# Patient Record
Sex: MALE
[De-identification: names, ages, dates, MRNs, and addresses within clinical notes are randomized per-mention and may not be internally consistent; named-entity substitution may affect disease eponyms.]

---

## 2017-07-31 ENCOUNTER — APPOINTMENT (OUTPATIENT)
Dept: ORTHOPEDIC SURGERY | Facility: CLINIC | Age: 52
End: 2017-07-31

## 2022-05-02 ENCOUNTER — APPOINTMENT (OUTPATIENT)
Dept: ORTHOPEDIC SURGERY | Facility: CLINIC | Age: 57
End: 2022-05-02
Payer: COMMERCIAL

## 2022-05-02 VITALS — HEIGHT: 70 IN | WEIGHT: 202 LBS | BODY MASS INDEX: 28.92 KG/M2

## 2022-05-02 DIAGNOSIS — Z78.9 OTHER SPECIFIED HEALTH STATUS: ICD-10-CM

## 2022-05-02 PROCEDURE — 73030 X-RAY EXAM OF SHOULDER: CPT | Mod: RT

## 2022-05-02 PROCEDURE — 99213 OFFICE O/P EST LOW 20 MIN: CPT | Mod: 25

## 2022-05-02 PROCEDURE — 73010 X-RAY EXAM OF SHOULDER BLADE: CPT | Mod: RT

## 2022-05-02 PROCEDURE — 99214 OFFICE O/P EST MOD 30 MIN: CPT

## 2022-05-03 NOTE — HISTORY OF PRESENT ILLNESS
[de-identified] : The patient is a 56 year old right hand dominant male who presents today complaining of right shoulder pain .\par Date of Injury/Onset: 3/2022\par Pain:    At Rest: 0/10 \par With Activity:  9/10 \par Mechanism of injury: pt states he was lifting a heavy object at home and felt immediate pain that has not resolved.\par This is not a Work Related Injury being treated under Worker's Compensation.\par This is not an athletic injury occurring associated with an interscholastic or organized sports team.\par Quality of symptoms: sharp pain with movement \par Improves with: rest\par Worse with: moving arm any direction\par Prior treatment: none\par Prior Imaging: none\par Reports Available For Review Today: none\par Out of work/sport: currently working\par School/Sport/Position/Occupation: [employer] \par Additional Information: none\par \par

## 2022-05-03 NOTE — IMAGING
[Right] : right shoulder [There are no fractures, subluxations or dislocations. No significant abnormalities are seen] : There are no fractures, subluxations or dislocations. No significant abnormalities are seen [de-identified] : The patient is a well appearing 56 year old M of their stated age.\par Neck is supple & nontender to palpation. Negative Spurling's test.\par \par Effected Shoulder:  right\par Inspection:\par Scapula Winging: Negative\par Deformity: None\par Erythema: None\par Ecchymosis: None\par Abrasions: None\par Effusion: None\par \par Range of Motion:\par Active Forward Flexion: 160 degrees \par Active Abduction: 160 degrees \par Passive Forward Flexion: 180 degrees \par Passive Abduction: 180 degrees \par ER @ 90 degrees: 90 degrees\par IR @ 90 degrees: 45 degrees\par ER @ 0 degrees: 50 degrees\par \par Motor Exam:\par Forward Flexion: 3 out of 5\par Flexion Plane of Scapula: 3 out of 5\par Abduction: 3 out of 5\par Internal Rotation: 3 out of 5\par External Rotation: 4 out of 5\par Distal Motor Strength: 5 out of 5\par Stability Testing:\par Anterior: 1+\par Posterior: 1+\par Sulcus N: 1+\par Sulcus ER: 1+\par \par Provocative Tests:\par Drop Arm: Negative\par Impingement: +\par [place]: Negative\par X-Arm Adduction: Negative\par Belly Press: Negative\par Bear Hug: Negative\par Lift Off: Negative\par Apprehension: Negative\par Relocation: Negative\par Posterior Load & Shift: Negative\par Palpation:\par AC Joint: Nontender\par Clavicle: Nontender\par SC Joint: Nontender\par Bicepital Groove: Nontender\par Coracoid Process: Nontender\par Pectoralis Minor Tendon: Nontender\par Pectoralis Major Tendon: Nontender & palpably intact\par Latissimus Dorsi: Nontender \par Proximal Humerus: Nontender\par Scapula Body: Nontender\par Medial Scapula Boarder: Nontender\par Scapula Spine: Nontender\par \par Neurologic Exam: Sensation to Light Touch:\par Axillary: Grossly intact\par Ulnar: Grossly intact\par Radial: Grossly intact\par Median: Grossly intact\par Other:  N/A\par Circulatory/Pulses:\par Ulnar: 2+\par Radial: 2+\par Other Pertinent Findings: None\par \par Contralateral Shoulder\par Range of Motion:\par Active Forward Flexion: 180 degrees \par Active Abduction: 180 degrees \par Passive Forward Flexion: 180 degrees \par Passive Abduction: 180 degrees \par ER @ 90 degrees: 90 degrees\par IR @ 90 degrees: 45 degrees\par ER @ 0 degrees: 50 degrees\par Motor Exam:\par Forward Flexion: 5 out of 5\par Flexion Plane of Scapula: 5 out of 5\par Abduction: 5 out of 5\par Internal Rotation: 5 out of 5\par External Rotation: 5 out of 5\par Distal Motor Strength: 5 out of 5\par Stability Testing:\par Anterior: 1+\par Posterior: 1+\par Sulcus N: 1+\par Sulcus ER: 1+\par Other Pertinent Findings: None\par \par \par Assessment: The patient is a 56 year old M with right shoulder pain and radiographic and 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\par   \par The patient’s condition is acute\par Documents/Results Reviewed Today: Xray right shoulder scapula\par Tests/Studies Independently Interpreted Today: Xray right shoulder scapula is unremarkable\par Pertinent findings include: +IMP, 3/5 FF FSP ABD IR, 4/5 ER, 160/160\par Confounding medical conditions/concerns: none\par \par Plan:  Patient will obtain MRI of their right shoulder\par Tests Ordered: MRI right shoulder to eval for RCT\par Prescription Medications Ordered: none\par Braces/DME Ordered: none\par Activity/Work/Sports Status: [employer]\par Additional Instructions: none\par Follow-Up: after MRI\par \par The patient's current medication management of their orthopedic diagnosis was reviewed today:\par (1) We discussed a comprehensive treatment plan that included possible pharmaceutical management involving the use of prescription strength medications including but not limited to options such as oral Naprosyn 500mg BID, once daily Meloxicam 15 mg, or 500-650 mg Tylenol versus over the counter oral medications and topical prescription NSAID Pennsaid vs over the counter Voltaren gel.\par (2) There is a moderate risk of morbidity with further treatment, especially from use of prescription strength medications and possible side effects of these medications which include upset stomach with oral medications, skin reactions to topical medications and cardiac/renal issues with long term use.\par (3) I recommended that the patient follow-up with their medical physician to discuss any significant specific potential issues with long term medication use such as interactions with current medications or with exacerbation of underlying medical comorbidities.\par (4) The benefits and risks associated with use of injectable, oral or topical, prescription and over the counter anti-inflammatory medications were discussed with the patient. The patient voiced understanding of the risks including but not limited to bleeding, stroke, kidney dysfunction, heart disease, and were referred to the black box warning label for further information.\par \par I, Mitchell Lai, attest that this documentation has been prepared under the direction and in the presence of Provider Dr. Geronimo\par \par The documentation recorded by the scribe accurately reflects the service I personally performed and the decisions made by me.\par \par \par  [FreeTextEntry1] : unremarkable  [FreeTextEntry5] : unremarkable

## 2022-05-09 ENCOUNTER — APPOINTMENT (OUTPATIENT)
Dept: MRI IMAGING | Facility: CLINIC | Age: 57
End: 2022-05-09

## 2022-05-13 ENCOUNTER — APPOINTMENT (OUTPATIENT)
Dept: MRI IMAGING | Facility: CLINIC | Age: 57
End: 2022-05-13

## 2022-05-16 ENCOUNTER — APPOINTMENT (OUTPATIENT)
Dept: ORTHOPEDIC SURGERY | Facility: CLINIC | Age: 57
End: 2022-05-16

## 2022-05-23 ENCOUNTER — APPOINTMENT (OUTPATIENT)
Dept: ORTHOPEDIC SURGERY | Facility: CLINIC | Age: 57
End: 2022-05-23

## 2022-05-26 ENCOUNTER — APPOINTMENT (OUTPATIENT)
Dept: ORTHOPEDIC SURGERY | Facility: CLINIC | Age: 57
End: 2022-05-26
Payer: COMMERCIAL

## 2022-05-26 VITALS — HEIGHT: 70 IN | WEIGHT: 202 LBS | BODY MASS INDEX: 28.92 KG/M2

## 2022-05-26 PROCEDURE — 99214 OFFICE O/P EST MOD 30 MIN: CPT

## 2022-05-26 RX ORDER — METHYLPREDNISOLONE 4 MG/1
4 TABLET ORAL
Qty: 1 | Refills: 0 | Status: ACTIVE | COMMUNITY
Start: 2022-05-26 | End: 1900-01-01

## 2022-05-27 NOTE — IMAGING
[Right] : right shoulder [There are no fractures, subluxations or dislocations. No significant abnormalities are seen] : There are no fractures, subluxations or dislocations. No significant abnormalities are seen [FreeTextEntry1] : unremarkable  [FreeTextEntry5] : unremarkable  [de-identified] : The patient is a well appearing 56 year old M of their stated age.\par Neck is supple & nontender to palpation. Negative Spurling's test.\par \par Effected Shoulder:  right\par Inspection:\par Scapula Winging: Negative\par Deformity: None\par Erythema: None\par Ecchymosis: None\par Abrasions: None\par Effusion: None\par \par Range of Motion:\par Active Forward Flexion: 160 degrees \par Active Abduction: 160 degrees \par Passive Forward Flexion: 180 degrees \par Passive Abduction: 180 degrees \par ER @ 90 degrees: 90 degrees\par IR @ 90 degrees: 45 degrees\par ER @ 0 degrees: 50 degrees\par \par Motor Exam:\par Forward Flexion: 3 out of 5\par Flexion Plane of Scapula: 3 out of 5\par Abduction: 3 out of 5\par Internal Rotation: 3 out of 5\par External Rotation: 4 out of 5\par Distal Motor Strength: 5 out of 5\par Stability Testing:\par Anterior: 1+\par Posterior: 1+\par Sulcus N: 1+\par Sulcus ER: 1+\par \par Provocative Tests:\par Drop Arm: Negative\par Impingement: +\par [place]: Negative\par X-Arm Adduction: Negative\par Belly Press: Negative\par Bear Hug: Negative\par Lift Off: Negative\par Apprehension: Negative\par Relocation: Negative\par Posterior Load & Shift: Negative\par Palpation:\par AC Joint: Nontender\par Clavicle: Nontender\par SC Joint: Nontender\par Bicepital Groove: Nontender\par Coracoid Process: Nontender\par Pectoralis Minor Tendon: Nontender\par Pectoralis Major Tendon: Nontender & palpably intact\par Latissimus Dorsi: Nontender \par Proximal Humerus: Nontender\par Scapula Body: Nontender\par Medial Scapula Boarder: Nontender\par Scapula Spine: Nontender\par \par Neurologic Exam: Sensation to Light Touch:\par Axillary: Grossly intact\par Ulnar: Grossly intact\par Radial: Grossly intact\par Median: Grossly intact\par Other:  N/A\par Circulatory/Pulses:\par Ulnar: 2+\par Radial: 2+\par Other Pertinent Findings: None\par \par Contralateral Shoulder\par Range of Motion:\par Active Forward Flexion: 180 degrees \par Active Abduction: 180 degrees \par Passive Forward Flexion: 180 degrees \par Passive Abduction: 180 degrees \par ER @ 90 degrees: 90 degrees\par IR @ 90 degrees: 45 degrees\par ER @ 0 degrees: 50 degrees\par Motor Exam:\par Forward Flexion: 5 out of 5\par Flexion Plane of Scapula: 5 out of 5\par Abduction: 5 out of 5\par Internal Rotation: 5 out of 5\par External Rotation: 5 out of 5\par Distal Motor Strength: 5 out of 5\par Stability Testing:\par Anterior: 1+\par Posterior: 1+\par Sulcus N: 1+\par Sulcus ER: 1+\par Other Pertinent Findings: None\par \par \par Assessment: The patient is a 56 year old M with right shoulder pain and radiographic and 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 Patient is again indicated to obtain MRI of their right shoulder\par Tests Ordered: MRI right shoulder to eval for RCT\par Prescription Medications Ordered: MDP\par Braces/DME Ordered: none\par Activity/Work/Sports Status: [employer]\par Additional Instructions: none\par Follow-Up: after MRI\par \par The patient's current medication management of their orthopedic diagnosis was reviewed today:\par (1) We discussed a comprehensive treatment plan that included possible pharmaceutical management involving the use of prescription strength medications including but not limited to options such as oral Naprosyn 500mg BID, once daily Meloxicam 15 mg, or 500-650 mg Tylenol versus over the counter oral medications and topical prescription NSAID Pennsaid vs over the counter Voltaren gel.\par (2) There is a moderate risk of morbidity with further treatment, especially from use of prescription strength medications and possible side effects of these medications which include upset stomach with oral medications, skin reactions to topical medications and cardiac/renal issues with long term use.\par (3) I recommended that the patient follow-up with their medical physician to discuss any significant specific potential issues with long term medication use such as interactions with current medications or with exacerbation of underlying medical comorbidities.\par (4) The benefits and risks associated with use of injectable, oral or topical, prescription and over the counter anti-inflammatory medications were discussed with the patient. The patient voiced understanding of the risks including but not limited to bleeding, stroke, kidney dysfunction, heart disease, and were referred to the black box warning label for further information.\par \par I, Jeanie Rascon, attest that this documentation has been prepared under the direction and in the presence of Provider Dr. Geronimo\par \par \par The documentation recorded by the scribe accurately reflects the service I personally performed and the decisions made by me.\par The patient was seen by me under the direct supervision of Dr. Moustapha Geronimo\par \par

## 2022-05-27 NOTE — HISTORY OF PRESENT ILLNESS
[de-identified] : The patient is a 56 year old right hand dominant male who presents today complaining of right shoulder pain .\par Date of Injury/Onset: 3/2022\par Pain:    At Rest: 0/10 \par With Activity:  9/10 \par Mechanism of injury: pt states he was lifting a heavy object at home and felt immediate pain that has not resolved.\par This is not a Work Related Injury being treated under Worker's Compensation.\par This is not an athletic injury occurring associated with an interscholastic or organized sports team.\par Quality of symptoms: sharp pain with movement \par Improves with: rest\par Worse with: moving arm any direction\par Treatment/Imaging/Studies Since Last Visit: None\par 	Reports Available For Review Today: None\par Reports Available For Review Today: none\par Out of work/sport: currently working\par School/Sport/Position/Occupation: [employer] \par Changes since last visit: No improvements. Would like a CSI today. \par Additional Information: none\par \par

## 2022-07-25 ENCOUNTER — NON-APPOINTMENT (OUTPATIENT)
Age: 57
End: 2022-07-25

## 2022-08-01 ENCOUNTER — APPOINTMENT (OUTPATIENT)
Dept: SURGERY | Facility: CLINIC | Age: 57
End: 2022-08-01

## 2022-08-01 VITALS — DIASTOLIC BLOOD PRESSURE: 84 MMHG | HEART RATE: 60 BPM | SYSTOLIC BLOOD PRESSURE: 154 MMHG

## 2022-08-01 VITALS — BODY MASS INDEX: 29.35 KG/M2 | WEIGHT: 205 LBS | TEMPERATURE: 97.2 F | HEIGHT: 70 IN

## 2022-08-01 DIAGNOSIS — K60.2 ANAL FISSURE, UNSPECIFIED: ICD-10-CM

## 2022-08-01 PROCEDURE — 45300 PROCTOSIGMOIDOSCOPY DX: CPT

## 2022-08-01 PROCEDURE — 99203 OFFICE O/P NEW LOW 30 MIN: CPT | Mod: 25

## 2022-08-01 RX ORDER — NITROGLYCERIN 4 MG/G
0.4 OINTMENT RECTAL
Qty: 1 | Refills: 0 | Status: ACTIVE | COMMUNITY
Start: 2022-08-01 | End: 1900-01-01

## 2022-08-02 ENCOUNTER — TRANSCRIPTION ENCOUNTER (OUTPATIENT)
Age: 57
End: 2022-08-02

## 2022-10-24 ENCOUNTER — APPOINTMENT (OUTPATIENT)
Dept: ORTHOPEDIC SURGERY | Facility: CLINIC | Age: 57
End: 2022-10-24

## 2022-10-24 VITALS — WEIGHT: 205 LBS | HEIGHT: 70 IN | BODY MASS INDEX: 29.35 KG/M2

## 2022-10-24 PROCEDURE — 99214 OFFICE O/P EST MOD 30 MIN: CPT

## 2022-10-25 NOTE — HISTORY OF PRESENT ILLNESS
[de-identified] : The patient is a 56 year old right hand dominant male who presents today complaining of right shoulder pain .\par Date of Injury/Onset: 3/2022\par Pain:    At Rest: 0/10 \par With Activity:  9/10 \par Mechanism of injury: pt states he was lifting a heavy object at home and felt immediate pain that has not resolved.\par This is not a Work Related Injury being treated under Worker's Compensation.\par This is not an athletic injury occurring associated with an interscholastic or organized sports team.\par Quality of symptoms: sharp pain with movement \par Improves with: rest\par Worse with: moving arm any direction\par Treatment/Imaging/Studies Since Last Visit: None\par 	Reports Available For Review Today: None\par Reports Available For Review Today: none\par Out of work/sport: currently working\par School/Sport/Position/Occupation: [employer] \par Changes since last visit: No changes. Continues to get denied for getting an MRI. \par Additional Information: none\par \par

## 2022-10-25 NOTE — IMAGING
[de-identified] : The patient is a well appearing 56 year old M of their stated age.\par Neck is supple & nontender to palpation. Negative Spurling's test.\par \par Effected Shoulder:  right\par Inspection:\par Scapula Winging: Negative\par Deformity: None\par Erythema: None\par Ecchymosis: None\par Abrasions: None\par Effusion: None\par \par Range of Motion:\par Active Forward Flexion: 160 degrees \par Active Abduction: 160 degrees \par Passive Forward Flexion: 180 degrees \par Passive Abduction: 180 degrees \par ER @ 90 degrees: 90 degrees\par IR @ 90 degrees: 45 degrees\par ER @ 0 degrees: 50 degrees\par \par Motor Exam:\par Forward Flexion: 3 out of 5\par Flexion Plane of Scapula: 3 out of 5\par Abduction: 3 out of 5\par Internal Rotation: 3 out of 5\par External Rotation: 4 out of 5\par Distal Motor Strength: 5 out of 5\par Stability Testing:\par Anterior: 1+\par Posterior: 1+\par Sulcus N: 1+\par Sulcus ER: 1+\par \par Provocative Tests:\par Drop Arm: Negative\par Impingement: +\par [place]: Negative\par X-Arm Adduction: Negative\par Belly Press: Negative\par Bear Hug: Negative\par Lift Off: Negative\par Apprehension: Negative\par Relocation: Negative\par Posterior Load & Shift: Negative\par Palpation:\par AC Joint: Nontender\par Clavicle: Nontender\par SC Joint: Nontender\par Bicepital Groove: Nontender\par Coracoid Process: Nontender\par Pectoralis Minor Tendon: Nontender\par Pectoralis Major Tendon: Nontender & palpably intact\par Latissimus Dorsi: Nontender \par Proximal Humerus: Nontender\par Scapula Body: Nontender\par Medial Scapula Boarder: Nontender\par Scapula Spine: Nontender\par \par Neurologic Exam: Sensation to Light Touch:\par Axillary: Grossly intact\par Ulnar: Grossly intact\par Radial: Grossly intact\par Median: Grossly intact\par Other:  N/A\par Circulatory/Pulses:\par Ulnar: 2+\par Radial: 2+\par Other Pertinent Findings: None\par \par Contralateral Shoulder\par Range of Motion:\par Active Forward Flexion: 180 degrees \par Active Abduction: 180 degrees \par Passive Forward Flexion: 180 degrees \par Passive Abduction: 180 degrees \par ER @ 90 degrees: 90 degrees\par IR @ 90 degrees: 45 degrees\par ER @ 0 degrees: 50 degrees\par Motor Exam:\par Forward Flexion: 5 out of 5\par Flexion Plane of Scapula: 5 out of 5\par Abduction: 5 out of 5\par Internal Rotation: 5 out of 5\par External Rotation: 5 out of 5\par Distal Motor Strength: 5 out of 5\par Stability Testing:\par Anterior: 1+\par Posterior: 1+\par Sulcus N: 1+\par Sulcus ER: 1+\par Other Pertinent Findings: None\par \par \par Assessment: The patient is a 56 year old M with right shoulder pain and radiographic and 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 Due to worsening pain and instability with mechanical symptoms, patient will obtain MRI right shoulder to eval for rotator cuff tear. Patient continues to regress with increased pain despite conservative treatment. This was an acute injury for which we suspect a rotator cuff tear. Patient has been unable to get approval for MRI due to lack of conservative treatment which is not the case. Patient will start physical therapy and return to us in 6 weeks for interval follow up.\par Tests Ordered: MRI right shoulder\par Prescription Medications Ordered: None \par Braces/DME Ordered: none\par Activity/Work/Sports Status: [employer]\par Additional Instructions: none\par Follow-Up: 6 weeks \par \par The patient's current medication management of their orthopedic diagnosis was reviewed today:\par (1) We discussed a comprehensive treatment plan that included possible pharmaceutical management involving the use of prescription strength medications including but not limited to options such as oral Naprosyn 500mg BID, once daily Meloxicam 15 mg, or 500-650 mg Tylenol versus over the counter oral medications and topical prescription NSAID Pennsaid vs over the counter Voltaren gel.\par (2) There is a moderate risk of morbidity with further treatment, especially from use of prescription strength medications and possible side effects of these medications which include upset stomach with oral medications, skin reactions to topical medications and cardiac/renal issues with long term use.\par (3) I recommended that the patient follow-up with their medical physician to discuss any significant specific potential issues with long term medication use such as interactions with current medications or with exacerbation of underlying medical comorbidities.\par (4) The benefits and risks associated with use of injectable, oral or topical, prescription and over the counter anti-inflammatory medications were discussed with the patient. The patient voiced understanding of the risks including but not limited to bleeding, stroke, kidney dysfunction, heart disease, and were referred to the black box warning label for further information.\par \par I, Carmen Dunham, attest that this documentation has been prepared under the direction and in the presence of Provider Dr. Moustapha Geronimo.\par \par The documentation recorded by the scribe accurately reflects the service I personally performed and the decisions made by me.\par The patient was seen by me under the direct supervision of Dr. Moustapha Geronimo\par

## 2022-12-20 ENCOUNTER — FORM ENCOUNTER (OUTPATIENT)
Age: 57
End: 2022-12-20

## 2022-12-21 ENCOUNTER — APPOINTMENT (OUTPATIENT)
Dept: MRI IMAGING | Facility: CLINIC | Age: 57
End: 2022-12-21

## 2022-12-21 PROCEDURE — 73221 MRI JOINT UPR EXTREM W/O DYE: CPT | Mod: RT

## 2022-12-23 ENCOUNTER — APPOINTMENT (OUTPATIENT)
Dept: ORTHOPEDIC SURGERY | Facility: CLINIC | Age: 57
End: 2022-12-23

## 2022-12-23 VITALS — WEIGHT: 205 LBS | BODY MASS INDEX: 29.35 KG/M2 | HEIGHT: 70 IN

## 2022-12-23 PROCEDURE — 99214 OFFICE O/P EST MOD 30 MIN: CPT | Mod: 25

## 2022-12-23 PROCEDURE — J3490M: CUSTOM

## 2022-12-23 PROCEDURE — 20610 DRAIN/INJ JOINT/BURSA W/O US: CPT | Mod: RT

## 2022-12-23 RX ORDER — NAPROXEN 500 MG/1
500 TABLET ORAL
Qty: 60 | Refills: 0 | Status: ACTIVE | COMMUNITY
Start: 2022-12-23 | End: 1900-01-01

## 2022-12-23 NOTE — DATA REVIEWED
[MRI] : MRI [Right] : of the right [Shoulder] : shoulder [Report was reviewed and noted in the chart] : The report was reviewed and noted in the chart [I independently reviewed and interpreted images and report] : I independently reviewed and interpreted images and report [I reviewed the films/CD and additionally noted] : I reviewed the films/CD and additionally noted [FreeTextEntry1] : small partial tear of the anterior aspect of the supraspinatus tendon

## 2022-12-23 NOTE — IMAGING
[de-identified] : The patient is a well appearing 56 year old M of their stated age.\par Neck is supple & nontender to palpation. Negative Spurling's test.\par \par Effected Shoulder:  right\par Inspection:\par Scapula Winging: Negative\par Deformity: None\par Erythema: None\par Ecchymosis: None\par Abrasions: None\par Effusion: None\par \par Range of Motion:\par Active Forward Flexion: 160 degrees \par Active Abduction: 160 degrees \par Passive Forward Flexion: 180 degrees \par Passive Abduction: 180 degrees \par ER @ 90 degrees: 90 degrees\par IR @ 90 degrees: 45 degrees\par ER @ 0 degrees: 50 degrees\par \par Motor Exam:\par Forward Flexion: 3 out of 5\par Flexion Plane of Scapula: 3 out of 5\par Abduction: 3 out of 5\par Internal Rotation: 3 out of 5\par External Rotation: 4 out of 5\par Distal Motor Strength: 5 out of 5\par Stability Testing:\par Anterior: 1+\par Posterior: 1+\par Sulcus N: 1+\par Sulcus ER: 1+\par \par Provocative Tests:\par Drop Arm: Negative\par Impingement: +\par [place]: Negative\par X-Arm Adduction: Negative\par Belly Press: Negative\par Bear Hug: Negative\par Lift Off: Negative\par Apprehension: Negative\par Relocation: Negative\par Posterior Load & Shift: Negative\par Palpation:\par AC Joint: Nontender\par Clavicle: Nontender\par SC Joint: Nontender\par Bicepital Groove: Nontender\par Coracoid Process: Nontender\par Pectoralis Minor Tendon: Nontender\par Pectoralis Major Tendon: Nontender & palpably intact\par Latissimus Dorsi: Nontender \par Proximal Humerus: Nontender\par Scapula Body: Nontender\par Medial Scapula Boarder: Nontender\par Scapula Spine: Nontender\par \par Neurologic Exam: Sensation to Light Touch:\par Axillary: Grossly intact\par Ulnar: Grossly intact\par Radial: Grossly intact\par Median: Grossly intact\par Other:  N/A\par Circulatory/Pulses:\par Ulnar: 2+\par Radial: 2+\par Other Pertinent Findings: None\par \par Contralateral Shoulder\par Range of Motion:\par Active Forward Flexion: 180 degrees \par Active Abduction: 180 degrees \par Passive Forward Flexion: 180 degrees \par Passive Abduction: 180 degrees \par ER @ 90 degrees: 90 degrees\par IR @ 90 degrees: 45 degrees\par ER @ 0 degrees: 50 degrees\par Motor Exam:\par Forward Flexion: 5 out of 5\par Flexion Plane of Scapula: 5 out of 5\par Abduction: 5 out of 5\par Internal Rotation: 5 out of 5\par External Rotation: 5 out of 5\par Distal Motor Strength: 5 out of 5\par Stability Testing:\par Anterior: 1+\par Posterior: 1+\par Sulcus N: 1+\par Sulcus ER: 1+\par Other Pertinent Findings: None\par \par \par Assessment: The patient is a 56 year old M with right shoulder pain and radiographic and physical exam findings consistent with partial rotator cuff tear.\par \par The patient’s condition is acute and worsening\par Documents/Results Reviewed Today: MRI right shoulder\par Tests/Studies Independently Interpreted Today: MRI right shoulder reveals small partial tear of the anterior aspect of the supraspinatus tendon \par Pertinent findings include: +IMP, 3/5 FF FSP ABD IR, 4/5 ER, 160/160\par Confounding medical conditions/concerns: none\par \par Plan: Discussed conservative treatments in the form of injections. Patient elected to receive right shoulder subacromial 9/1 CSI.  Patient will start physical therapy, HEP, and stretching. Advised patient to obtain Reparel sleeve. Prescribed Naprosyn for pain management - use as directed. Modify activity as discussed. \par Tests Ordered: none \par Prescription Medications Ordered: Naprosyn\par Braces/DME Ordered: none\par Activity/Work/Sports Status: [employer]\par Additional Instructions: none\par Follow-Up: 6 weeks \par \par Procedure Note: Musculoskeletal Injection \par Diagnosis:  right shoulder partial rotator cuff tear\par Procedure:  right shoulder, subacromial, 9/1 CSI \par \par Indication:  The patient has had persistent pain despite conservative treatment.  Risks, benefits and alternatives to procedure were discussed; all questions were answered to the patient's apparent satisfaction and informed consent obtained.  The patient denied prior problems with local anesthetics, injectable cortisones, chicken allergy, coagulopathy and no relevant drug or preservative allergies or sensitivities.\par \par The area of injection was prepared in a sterile fashion.  Prior to injection a 'Time Out' was conducted in accordance with [facility] policy and the site and nature of procedure verified with the patient. \par \par Procedure: \par The procedure was carried out utilizing sterile technique from a subacromial arthroscopic portal position. \par 0cc of clear synovial fluid was aspirated. \par The specimen: \par (X) appeared benign and was discarded \par ( ) was sent for Culture / Cell Count / Crystal analysis / [_].] \par \par Injection into the target area with care taken to aspirate frequently to minimize the risk of intravascular injection was performed with: \par ( ) 1cc of Depomedrol (80mg/ml) \par (X) 1cc of Dexamethasone (10mg/ml) \par ( ) 1cc of Toradol (30mg/ml) \par (X) 9cc of 0.5% Bupivacaine \par ( ) 1cc of 1% Lidocaine \par ( ) 5cc of 32mg Zilretta, prepared and diluted per  instructions \par ( ) 2 cc of Hylan G-F 20 (Synvisc) 16mg/2ml \par ( ) 6 cc of Hylan G-F 20 (SynvisoOne) 16mg/2ml \par ( ) 2cc of Euflexxa \par ( ) 2cc of Orthovisc \par ( ) 2cc of GelOne \par ( ) 3cc of Durolane (20mg/ml) \par \par Patient tolerated the procedure well and direct pressure was applied for hemostasis. The patient was reminded of potential post-injection risks including, but not limited to, delayed hypersensitivity reactions and/or infection.  The patient verified that they had the office and the Emergency Room's contact information if any problems should arise.  After several minutes, the patient informed me that they felt fine and was released from the office. \par \par The patient's current medication management of their orthopedic diagnosis was reviewed today:\par (1) We discussed a comprehensive treatment plan that included possible pharmaceutical management involving the use of prescription strength medications including but not limited to options such as oral Naprosyn 500mg BID, once daily Meloxicam 15 mg, or 500-650 mg Tylenol versus over the counter oral medications and topical prescription NSAID Pennsaid vs over the counter Voltaren gel.\par (2) There is a moderate risk of morbidity with further treatment, especially from use of prescription strength medications and possible side effects of these medications which include upset stomach with oral medications, skin reactions to topical medications and cardiac/renal issues with long term use.\par (3) I recommended that the patient follow-up with their medical physician to discuss any significant specific potential issues with long term medication use such as interactions with current medications or with exacerbation of underlying medical comorbidities.\par (4) The benefits and risks associated with use of injectable, oral or topical, prescription and over the counter anti-inflammatory medications were discussed with the patient. The patient voiced understanding of the risks including but not limited to bleeding, stroke, kidney dysfunction, heart disease, and were referred to the black box warning label for further information.\par \par I, Carmen Dunham, attest that this documentation has been prepared under the direction and in the presence of Provider Dr. Moustapha Geronimo.\par \par The documentation recorded by the scribe accurately reflects the service I personally performed and the decisions made by me.\par The patient was seen by me under the direct supervision of Dr. Moustapha Geronimo\par

## 2022-12-23 NOTE — HISTORY OF PRESENT ILLNESS
[de-identified] : The patient is a 56 year old right hand dominant male who presents today complaining of right shoulder pain .\par Date of Injury/Onset: 3/2022\par Pain:    At Rest: 0/10 \par With Activity:  9/10 \par Mechanism of injury: pt states he was lifting a heavy object at home and felt immediate pain that has not resolved.\par This is not a Work Related Injury being treated under Worker's Compensation.\par This is not an athletic injury occurring associated with an interscholastic or organized sports team.\par Quality of symptoms: sharp pain with movement \par Improves with: rest\par Worse with: moving arm any direction\par Treatment/Imaging/Studies Since Last Visit: PT\par 	Reports Available For Review Today: None\par Reports Available For Review Today: none\par Out of work/sport: currently working\par School/Sport/Position/Occupation: [employer] \par Changes since last visit: No changes. Completed PT and patient states his symptoms and pain got worse from\par Additional Information: none\par \par

## 2023-01-30 ENCOUNTER — APPOINTMENT (OUTPATIENT)
Dept: ORTHOPEDIC SURGERY | Facility: CLINIC | Age: 58
End: 2023-01-30
Payer: COMMERCIAL

## 2023-01-30 VITALS — BODY MASS INDEX: 29.35 KG/M2 | HEIGHT: 70 IN | WEIGHT: 205 LBS

## 2023-01-30 PROCEDURE — 99214 OFFICE O/P EST MOD 30 MIN: CPT

## 2023-01-30 RX ORDER — NAPROXEN 500 MG/1
500 TABLET ORAL TWICE DAILY
Qty: 30 | Refills: 0 | Status: ACTIVE | COMMUNITY
Start: 2023-01-30 | End: 1900-01-01

## 2023-01-31 NOTE — IMAGING
[de-identified] : The patient is a well appearing 56 year old M of their stated age.\par Neck is supple & nontender to palpation. Negative Spurling's test.\par \par Effected Shoulder: RIGHT \par Inspection:\par Scapula Winging: Negative\par Deformity: None\par Erythema: None\par Ecchymosis: None\par Abrasions: None\par Effusion: None\par \par Range of Motion: WITH CREPITUS \par Active Forward Flexion: 170 degrees \par Active Abduction: 170 degrees \par Passive Forward Flexion: 170 degrees \par Passive Abduction: 170 degrees \par ER @ 90 degrees: 90 degrees\par IR @ 90 degrees: 35 degrees\par ER @ 0 degrees: 35 degrees\par \par Motor Exam:\par Forward Flexion: 5- out of 5\par Flexion Plane of Scapula: 5- out of 5\par Abduction: 5- out of 5\par Internal Rotation: 5- out of 5\par External Rotation: 5- out of 5\par Distal Motor Strength: 5 out of 5\par Stability Testing:\par Anterior: 1+\par Posterior: 1+\par Sulcus N: 1+\par Sulcus ER: 1+\par \par Provocative Tests:\par Drop Arm: Negative\par Impingement: POSITIVE\par [place]: Negative\par X-Arm Adduction: Negative\par Belly Press: Negative\par Bear Hug: Negative\par Lift Off: Negative\par Apprehension: Negative\par Relocation: Negative\par Posterior Load & Shift: Negative\par Palpation:\par AC Joint: Nontender\par Clavicle: Nontender\par SC Joint: Nontender\par Bicepital Groove: Nontender\par Coracoid Process: Nontender\par Pectoralis Minor Tendon: Nontender\par Pectoralis Major Tendon: Nontender & palpably intact\par Latissimus Dorsi: Nontender \par Proximal Humerus: Nontender\par Scapula Body: Nontender\par Medial Scapula Boarder: Nontender\par Scapula Spine: Nontender\par \par Neurologic Exam: Sensation to Light Touch:\par Axillary: Grossly intact\par Ulnar: Grossly intact\par Radial: Grossly intact\par Median: Grossly intact\par Other:  N/A\par Circulatory/Pulses:\par Ulnar: 2+\par Radial: 2+\par Other Pertinent Findings: None\par \par Contralateral Shoulder\par Range of Motion:\par Active Forward Flexion: 180 degrees \par Active Abduction: 180 degrees \par Passive Forward Flexion: 180 degrees \par Passive Abduction: 180 degrees \par ER @ 90 degrees: 90 degrees\par IR @ 90 degrees: 45 degrees\par ER @ 0 degrees: 50 degrees\par Motor Exam:\par Forward Flexion: 5 out of 5\par Flexion Plane of Scapula: 5 out of 5\par Abduction: 5 out of 5\par Internal Rotation: 5 out of 5\par External Rotation: 5 out of 5\par Distal Motor Strength: 5 out of 5\par Stability Testing:\par Anterior: 1+\par Posterior: 1+\par Sulcus N: 1+\par Sulcus ER: 1+\par Other Pertinent Findings: None\par \par \par Assessment: The patient is a 56 year old M with right shoulder pain and radiographic and physical exam findings consistent with partial rotator cuff tear.\par \par The patient’s condition is acute and worsening\par Documents/Results Reviewed Today: None \par Tests/Studies Independently Interpreted Today: None \par Pertinent findings include: +IMP, 5-/5 throughout, 170/170/90/35/35 with crepitus\par Confounding medical conditions/concerns: none\par \par Plan: Patient has experienced relief s/p corticosteroid injection and physical therapy sessions. Patient will continue physical therapy, HEP, and stretching. Patient did not  their Naprosyn prescription after prior visit so I have reordered it. Prescribed Naprosyn 200mg for pain management - use as directed. Advised patient to ice and rest the area. Modify activity as discussed. \par Tests Ordered: None \par Prescription Medications Ordered: Naprosyn 200mg \par Braces/DME Ordered: None\par Activity/Work/Sports Status: [employer]\par Additional Instructions: None\par Follow-Up: 4 weeks \par \par The patient's current medication management of their orthopedic diagnosis was reviewed today:\par (1) We discussed a comprehensive treatment plan that included possible pharmaceutical management involving the use of prescription strength medications including but not limited to options such as oral Naprosyn 500mg BID, once daily Meloxicam 15 mg, or 500-650 mg Tylenol versus over the counter oral medications and topical prescription NSAID Pennsaid vs over the counter Voltaren gel.  Based on our extensive discussion, the patient was prescribed Naprosyn 500mg BID for two weeks.  It will then be used PRN for pain, inflammation and discomfort.\par (2) There is a moderate risk of morbidity with further treatment, especially from use of prescription strength medications and possible side effects of these medications which include upset stomach with oral medications, skin reactions to topical medications and cardiac/renal issues with long term use.\par (3) I recommended that the patient follow-up with their medical physician to discuss any significant specific potential issues with long term medication use such as interactions with current medications or with exacerbation of underlying medical comorbidities.\par (4) The benefits and risks associated with use of injectable, oral or topical, prescription and over the counter anti-inflammatory medications were discussed with the patient. The patient voiced understanding of the risks including but not limited to bleeding, stroke, kidney dysfunction, heart disease, and were referred to the black box warning label for further information.\par \par \par \par I, Carmen Dunham, attest that this documentation has been prepared under the direction and in the presence of Provider Dr. Moustapha Geronimo.\par \par \par The documentation recorded by the scribe accurately reflects the services I, Dr. oMustapha Geronimo, personally performed and the decisions made by me.\par

## 2023-01-31 NOTE — HISTORY OF PRESENT ILLNESS
[de-identified] : The patient is a 56 year old right hand dominant male who presents today complaining of right shoulder pain .\par Date of Injury/Onset: 3/2022\par Pain:    At Rest: 0/10 \par With Activity:  5/10 \par Mechanism of injury: pt states he was lifting a heavy object at home and felt immediate pain that has not resolved.\par This is not a Work Related Injury being treated under Worker's Compensation.\par This is not an athletic injury occurring associated with an interscholastic or organized sports team.\par Quality of symptoms: sharp pain with movement \par Improves with: rest. PT\par Worse with: moving arm any direction\par Treatment/Imaging/Studies Since Last Visit: PT\par 	Reports Available For Review Today: None\par Reports Available For Review Today: none\par Out of work/sport: currently working\par School/Sport/Position/Occupation: [employer] \par Changes since last visit: No changes. Completed PT and patient states his symptoms and pain got worse from\par Additional Information: none\par \par

## 2023-02-27 ENCOUNTER — APPOINTMENT (OUTPATIENT)
Dept: ORTHOPEDIC SURGERY | Facility: CLINIC | Age: 58
End: 2023-02-27
Payer: COMMERCIAL

## 2023-02-27 VITALS — BODY MASS INDEX: 29.35 KG/M2 | HEIGHT: 70 IN | WEIGHT: 205 LBS

## 2023-02-27 DIAGNOSIS — M75.101 UNSPECIFIED ROTATOR CUFF TEAR OR RUPTURE OF RIGHT SHOULDER, NOT SPECIFIED AS TRAUMATIC: ICD-10-CM

## 2023-02-27 PROCEDURE — 20610 DRAIN/INJ JOINT/BURSA W/O US: CPT | Mod: RT

## 2023-02-27 PROCEDURE — J3490M: CUSTOM

## 2023-02-27 PROCEDURE — 99214 OFFICE O/P EST MOD 30 MIN: CPT | Mod: 25

## 2023-02-28 NOTE — IMAGING
[de-identified] : The patient is a well appearing 56 year old M of their stated age.\par Neck is supple & nontender to palpation. Negative Spurling's test.\par \par Effected Shoulder: RIGHT \par Inspection:\par Scapula Winging: Negative\par Deformity: None\par Erythema: None\par Ecchymosis: None\par Abrasions: None\par Effusion: None\par \par Range of Motion: WITH CREPITUS \par Active Forward Flexion: 170 degrees \par Active Abduction: 170 degrees \par Passive Forward Flexion: 170 degrees \par Passive Abduction: 170 degrees \par ER @ 90 degrees: 90 degrees\par IR @ 90 degrees: 35 degrees\par ER @ 0 degrees: 35 degrees\par \par Motor Exam:\par Forward Flexion: 5- out of 5\par Flexion Plane of Scapula: 5- out of 5\par Abduction: 5- out of 5\par Internal Rotation: 5- out of 5\par External Rotation: 5- out of 5\par Distal Motor Strength: 5 out of 5\par Stability Testing:\par Anterior: 1+\par Posterior: 1+\par Sulcus N: 1+\par Sulcus ER: 1+\par \par Provocative Tests:\par Drop Arm: Negative\par Impingement: POSITIVE\par [place]: Negative\par X-Arm Adduction: Negative\par Belly Press: Negative\par Bear Hug: Negative\par Lift Off: Negative\par Apprehension: Negative\par Relocation: Negative\par Posterior Load & Shift: Negative\par Palpation:\par AC Joint: Nontender\par Clavicle: Nontender\par SC Joint: Nontender\par Bicepital Groove: Nontender\par Coracoid Process: Nontender\par Pectoralis Minor Tendon: Nontender\par Pectoralis Major Tendon: Nontender & palpably intact\par Latissimus Dorsi: Nontender \par Proximal Humerus: Nontender\par Scapula Body: Nontender\par Medial Scapula Boarder: Nontender\par Scapula Spine: Nontender\par \par Neurologic Exam: Sensation to Light Touch:\par Axillary: Grossly intact\par Ulnar: Grossly intact\par Radial: Grossly intact\par Median: Grossly intact\par Other:  N/A\par Circulatory/Pulses:\par Ulnar: 2+\par Radial: 2+\par Other Pertinent Findings: None\par \par Contralateral Shoulder\par Range of Motion:\par Active Forward Flexion: 180 degrees \par Active Abduction: 180 degrees \par Passive Forward Flexion: 180 degrees \par Passive Abduction: 180 degrees \par ER @ 90 degrees: 90 degrees\par IR @ 90 degrees: 45 degrees\par ER @ 0 degrees: 50 degrees\par Motor Exam:\par Forward Flexion: 5 out of 5\par Flexion Plane of Scapula: 5 out of 5\par Abduction: 5 out of 5\par Internal Rotation: 5 out of 5\par External Rotation: 5 out of 5\par Distal Motor Strength: 5 out of 5\par Stability Testing:\par Anterior: 1+\par Posterior: 1+\par Sulcus N: 1+\par Sulcus ER: 1+\par Other Pertinent Findings: None\par \par \par Assessment: The patient is a 56 year old M with right shoulder pain and radiographic and physical exam findings consistent with partial rotator cuff tear.\par \par The patient’s condition is acute and worsening\par Documents/Results Reviewed Today: Re-reviewed MRI right shoulder \par Tests/Studies Independently Interpreted Today: Re-reviewed MRI right shoulder reveals anterior free edge tear of supraspinatus tendon \par Pertinent findings include: +IMP, 5-/5 IR and ER, 4+/5 ABD/FSP/FF, 170/170/90/10/20 with crepitus\par Confounding medical conditions/concerns: none\par \par Plan: Discussed conservative treatments in the form of injections. Patient elected to receive right shoulder 9/1 CSI. Patient will continue physical therapy, HEP, and stretching. Continue use of Naprosyn as needed - use as directed. Advised patient to ice and rest the area. Modify activity as discussed. If patient has no improvement, will consider surgical intervention. \par Tests Ordered: None \par Prescription Medications Ordered: Continue PRN use Naprosyn 200mg \par Braces/DME Ordered: None\par Activity/Work/Sports Status: [employer]\par Additional Instructions: None\par Follow-Up: 4 weeks \par \par Procedure Note: Musculoskeletal Injection \par Diagnosis:  right shoulder partial rotator cuff tear \par Procedure:  right shoulder, subacromial, 9/1 CSI \par \par Indication:  The patient has had persistent pain despite conservative treatment.  Risks, benefits and alternatives to procedure were discussed; all questions were answered to the patient's apparent satisfaction and informed consent obtained.  The patient denied prior problems with local anesthetics, injectable cortisones, chicken allergy, coagulopathy and no relevant drug or preservative allergies or sensitivities.\par \par The area of injection was prepared in a sterile fashion.  Prior to injection a 'Time Out' was conducted in accordance with [facility] policy and the site and nature of procedure verified with the patient. \par \par Procedure: \par The procedure was carried out utilizing sterile technique from a subacromial arthroscopic portal position. \par \par 0cc of clear synovial fluid was aspirated. \par The specimen: \par (X) appeared benign and was discarded \par ( ) was sent for Culture / Cell Count / Crystal analysis / [_].] \par \par Injection into the target area with care taken to aspirate frequently to minimize the risk of intravascular injection was performed with: \par ( ) 1cc of Depomedrol (80mg/ml) \par (X) 1cc of Dexamethasone (10mg/ml) \par ( ) 1cc of Toradol (30mg/ml) \par (X) 9cc of 0.5% Bupivacaine \par ( ) 1cc of 1% Lidocaine \par ( ) 5cc of 32mg Zilretta, prepared and diluted per  instructions \par ( ) 2 cc of Hylan G-F 20 (Synvisc) 16mg/2ml \par ( ) 6 cc of Hylan G-F 20 (SynvisoOne) 16mg/2ml \par ( ) 2cc of Euflexxa \par ( ) 2cc of Orthovisc \par ( ) 2cc of GelOne \par ( ) 3cc of Durolane (20mg/ml) \par \par Patient tolerated the procedure well and direct pressure was applied for hemostasis. The patient was reminded of potential post-injection risks including, but not limited to, delayed hypersensitivity reactions and/or infection.  The patient verified that they had the office and the Emergency Room's contact information if any problems should arise.  After several minutes, the patient informed me that they felt fine and was released from the office. \par \par The patient's current medication management of their orthopedic diagnosis was reviewed today:\par (1) The patient will continue with the PRN use of their prescribed anti-inflammatory.\par (2) There is a moderate risk of morbidity with further treatment, especially from use of prescription strength medications and possible side effects of these medications which include upset stomach with oral medications, skin reactions to topical medications and cardiac/renal issues with long term use.\par (3) I recommended that the patient follow-up with their medical physician to discuss any significant specific potential issues with long term medication use such as interactions with current medications or with exacerbation of underlying medical comorbidities.\par (4) The benefits and risks associated with use of injectable, oral or topical, prescription and over the counter anti-inflammatory medications were discussed with the patient. The patient voiced understanding of the risks including but not limited to bleeding, stroke, kidney dysfunction, heart disease, and were referred to the black box warning label for further information.\par \par I, Carmen Dunham, attest that this documentation has been prepared under the direction and in the presence of Provider Dr. Moustapha Geronimo.\par \par The documentation recorded by the scribe accurately reflects the services I, Dr. Moustapha Geronimo, personally performed and the decisions made by me.\par

## 2023-02-28 NOTE — DATA REVIEWED
[MRI] : MRI [Right] : of the right [Shoulder] : shoulder [Report was reviewed and noted in the chart] : The report was reviewed and noted in the chart [I independently reviewed and interpreted images and report] : I independently reviewed and interpreted images and report [I reviewed the films/CD and additionally noted] : I reviewed the films/CD and additionally noted [FreeTextEntry1] :  anterior free edge tear of supraspinatus tendon

## 2023-02-28 NOTE — HISTORY OF PRESENT ILLNESS
[de-identified] : The patient is a 56 year old right hand dominant male who presents today complaining of right shoulder pain .\par Date of Injury/Onset: 3/2022\par Pain:    At Rest: 0/10 \par With Activity:  8/10 \par Mechanism of injury: pt states he was lifting a heavy object at home and felt immediate pain that has not resolved.\par This is not a Work Related Injury being treated under Worker's Compensation.\par This is not an athletic injury occurring associated with an interscholastic or organized sports team.\par Quality of symptoms: sharp pain with movement \par Improves with: rest. PT\par Worse with: moving arm any direction\par Treatment/Imaging/Studies Since Last Visit: PT\par 	Reports Available For Review Today: None\par Reports Available For Review Today: none\par Out of work/sport: currently working\par School/Sport/Position/Occupation: [employer] \par Changes since last visit: Patient is going to PT and has not had any improvement in pain or ROM since last visit\par Additional Information: none\par \par

## 2023-03-27 ENCOUNTER — APPOINTMENT (OUTPATIENT)
Dept: ORTHOPEDIC SURGERY | Facility: CLINIC | Age: 58
End: 2023-03-27
Payer: COMMERCIAL

## 2023-03-27 VITALS — BODY MASS INDEX: 29.35 KG/M2 | WEIGHT: 205 LBS | HEIGHT: 70 IN

## 2023-03-27 DIAGNOSIS — M25.511 PAIN IN RIGHT SHOULDER: ICD-10-CM

## 2023-03-27 DIAGNOSIS — M75.111 INCOMPLETE ROTATOR CUFF TEAR OR RUPTURE OF RIGHT SHOULDER, NOT SPECIFIED AS TRAUMATIC: ICD-10-CM

## 2023-03-27 PROCEDURE — 99213 OFFICE O/P EST LOW 20 MIN: CPT

## 2023-03-28 NOTE — IMAGING
[de-identified] : The patient is a well appearing 56 year old M of their stated age.\par Neck is supple & nontender to palpation. Negative Spurling's test.\par \par Effected Shoulder: RIGHT \par Inspection:\par Scapula Winging: Negative\par Deformity: None\par Erythema: None\par Ecchymosis: None\par Abrasions: None\par Effusion: None\par \par Range of Motion: WITH CREPITUS \par Active Forward Flexion: 170 degrees \par Active Abduction: 170 degrees \par Passive Forward Flexion: 170 degrees \par Passive Abduction: 170 degrees \par ER @ 90 degrees: 85 degrees\par IR @ 90 degrees: 35 degrees\par ER @ 0 degrees: 35 degrees\par \par Motor Exam:\par Forward Flexion: 4- out of 5\par Flexion Plane of Scapula: 4 out of 5\par Abduction: 4 out of 5\par Internal Rotation: 4 out of 5\par External Rotation: 4- out of 5\par Distal Motor Strength: 5 out of 5\par Stability Testing:\par Anterior: 1+\par Posterior: 1+\par Sulcus N: 1+\par Sulcus ER: 1+\par \par Provocative Tests:\par Drop Arm: Negative\par Impingement: POSITIVE\par [place]: Negative\par X-Arm Adduction: Negative\par Belly Press: Negative\par Bear Hug: Negative\par Lift Off: Negative\par Apprehension: Negative\par Relocation: Negative\par Posterior Load & Shift: Negative\par Palpation:\par AC Joint: Nontender\par Clavicle: Nontender\par SC Joint: Nontender\par Bicepital Groove: Nontender\par Coracoid Process: Nontender\par Pectoralis Minor Tendon: Nontender\par Pectoralis Major Tendon: Nontender & palpably intact\par Latissimus Dorsi: Nontender \par Proximal Humerus: Nontender\par Scapula Body: Nontender\par Medial Scapula Boarder: Nontender\par Scapula Spine: Nontender\par \par Neurologic Exam: Sensation to Light Touch:\par Axillary: Grossly intact\par Ulnar: Grossly intact\par Radial: Grossly intact\par Median: Grossly intact\par Other:  N/A\par Circulatory/Pulses:\par Ulnar: 2+\par Radial: 2+\par Other Pertinent Findings: None\par \par Contralateral Shoulder\par Range of Motion:\par Active Forward Flexion: 180 degrees \par Active Abduction: 180 degrees \par Passive Forward Flexion: 180 degrees \par Passive Abduction: 180 degrees \par ER @ 90 degrees: 90 degrees\par IR @ 90 degrees: 45 degrees\par ER @ 0 degrees: 50 degrees\par Motor Exam:\par Forward Flexion: 5 out of 5\par Flexion Plane of Scapula: 5 out of 5\par Abduction: 5 out of 5\par Internal Rotation: 5 out of 5\par External Rotation: 5 out of 5\par Distal Motor Strength: 5 out of 5\par Stability Testing:\par Anterior: 1+\par Posterior: 1+\par Sulcus N: 1+\par Sulcus ER: 1+\par Other Pertinent Findings: None\par \par \par Assessment: The patient is a 57 year old male with right shoulder pain and radiographic and physical exam findings consistent with partial rotator cuff tear.\par \par The patient’s condition is acute and worsening\par Documents/Results Reviewed Today: None \par Tests/Studies Independently Interpreted Today: None \par Pertinent findings include: 170/170/85/35/35, +IMP with crepitus, 4/5 ABD, FSP, & IR, 4-/5 FF & ER\par Confounding medical conditions/concerns: none\par \par Plan: The patient has proven refractory to two previous CSI. At this time, the patient is unable to proceed with with booking of surgical intervention secondary to his work schedule. Discussed that the patient requires formal physical therapy in order to continue with duties of employment. THIS SERVES AS A LETTER OF MEDICALLY NECESSITY THAT THE PATIENT RETURN TO PHYSICAL THERAPY IN ORDER TO ENSURE AN OPTIMAL SURGICAL OUTCOME. Patient will continue physical therapy, HEP, and stretching. Continue use of Naprosyn as needed - use as directed. Modify activity as discussed. \par Tests Ordered: None \par Prescription Medications Ordered: Continue PRN use Naprosyn 200mg \par Braces/DME Ordered: None\par Activity/Work/Sports Status: [employer]\par Additional Instructions: None\par Follow-Up: 4 months  \par \par The patient's current medication management of their orthopedic diagnosis was reviewed today:\par (1) The patient will continue with the PRN use of their prescribed anti-inflammatory.\par (2) There is a moderate risk of morbidity with further treatment, especially from use of prescription strength medications and possible side effects of these medications which include upset stomach with oral medications, skin reactions to topical medications and cardiac/renal issues with long term use.\par (3) I recommended that the patient follow-up with their medical physician to discuss any significant specific potential issues with long term medication use such as interactions with current medications or with exacerbation of underlying medical comorbidities.\par (4) The benefits and risks associated with use of injectable, oral or topical, prescription and over the counter anti-inflammatory medications were discussed with the patient. The patient voiced understanding of the risks including but not limited to bleeding, stroke, kidney dysfunction, heart disease, and were referred to the black box warning label for further information. \par \par Lyndsay BELTRAN attest that this documentation has been prepared under the direction and in the presence of Provider Dr. Moustapha Geronimo. \par \par The documentation recorded by the scribe accurately reflects the service I Deshaun Ledesma PA-C personally performed and the decisions made by me.\par The patient was seen by me under the direct supervision of Dr. Moustapha Geronimo\par

## 2023-03-28 NOTE — HISTORY OF PRESENT ILLNESS
[de-identified] : The patient is a 56 year old right hand dominant male who presents today complaining of right shoulder pain .\par Date of Injury/Onset: 3/2022\par Pain:    At Rest: 0/10 \par With Activity:  8/10 \par Mechanism of injury: pt states he was lifting a heavy object at home and felt immediate pain that has not resolved.\par This is not a Work Related Injury being treated under Worker's Compensation.\par This is not an athletic injury occurring associated with an interscholastic or organized sports team.\par Quality of symptoms: sharp pain with movement \par Improves with: rest \par Worse with: abduction and external roation\par Treatment/Imaging/Studies Since Last Visit: HEP\par 	Reports Available For Review Today: None\par Reports Available For Review Today: none\par Out of work/sport: currently working\par School/Sport/Position/Occupation: [employer] \par Changes since last visit: Patient's insurance company denied him PT. Is doing HEP. Reports that he has o improvement\par Additional Information: none\par \par